# Patient Record
Sex: MALE | Race: WHITE | Employment: FULL TIME | URBAN - METROPOLITAN AREA
[De-identification: names, ages, dates, MRNs, and addresses within clinical notes are randomized per-mention and may not be internally consistent; named-entity substitution may affect disease eponyms.]

---

## 2020-07-28 ENCOUNTER — HOSPITAL ENCOUNTER (OUTPATIENT)
Age: 35
Discharge: HOME | End: 2020-07-28
Payer: COMMERCIAL

## 2020-07-28 VITALS
DIASTOLIC BLOOD PRESSURE: 75 MMHG | BODY MASS INDEX: 25.06 KG/M2 | TEMPERATURE: 98 F | RESPIRATION RATE: 16 BRPM | HEIGHT: 72 IN | WEIGHT: 185 LBS | HEART RATE: 58 BPM | OXYGEN SATURATION: 100 % | SYSTOLIC BLOOD PRESSURE: 128 MMHG

## 2020-07-28 DIAGNOSIS — Z20.822 SUSPECTED COVID-19 VIRUS INFECTION: Primary | ICD-10-CM

## 2020-07-28 LAB — S PYO AG THROAT QL: NEGATIVE

## 2020-07-28 PROCEDURE — 87880 STREP A ASSAY W/OPTIC: CPT | Performed by: PHYSICIAN ASSISTANT

## 2020-07-28 PROCEDURE — 99202 OFFICE O/P NEW SF 15 MIN: CPT | Performed by: PHYSICIAN ASSISTANT

## 2020-07-28 NOTE — ED INITIAL ASSESSMENT (HPI)
Patient states having sore throat, increased fatigue, body aches since yesterday. Patient states having some intermittent nausea, denies v/d. 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

## 2020-07-28 NOTE — ED PROVIDER NOTES
Patient Seen in: [address]      History   Patient presents with:  Testing  Sore Throat  Fatigue    Stated Complaint: covid testing    HPI    28 yo male presents for COVID testing.   Pt having some sore throat, increased General: Abdomen is flat. Musculoskeletal: Normal range of motion. Skin:     General: Skin is warm. Neurological:      General: No focal deficit present. Mental Status: He is alert and oriented to person, place, and time.    Psychiatric:

## 2020-07-30 LAB — SARS-COV-2 RNA RESP QL NAA+PROBE: NOT DETECTED

## 2020-10-05 ENCOUNTER — HOSPITAL ENCOUNTER (OUTPATIENT)
Age: 35
Discharge: HOME | End: 2020-10-05
Payer: COMMERCIAL

## 2020-10-05 VITALS
RESPIRATION RATE: 14 BRPM | SYSTOLIC BLOOD PRESSURE: 141 MMHG | DIASTOLIC BLOOD PRESSURE: 74 MMHG | BODY MASS INDEX: 25 KG/M2 | HEART RATE: 64 BPM | OXYGEN SATURATION: 100 % | WEIGHT: 185 LBS | TEMPERATURE: 98 F

## 2020-10-05 DIAGNOSIS — J02.9 VIRAL PHARYNGITIS: Primary | ICD-10-CM

## 2020-10-05 DIAGNOSIS — Z20.822 ENCOUNTER FOR LABORATORY TESTING FOR COVID-19 VIRUS: ICD-10-CM

## 2020-10-05 PROCEDURE — 87880 STREP A ASSAY W/OPTIC: CPT | Performed by: NURSE PRACTITIONER

## 2020-10-05 PROCEDURE — 99213 OFFICE O/P EST LOW 20 MIN: CPT | Performed by: NURSE PRACTITIONER

## 2020-10-05 NOTE — ED INITIAL ASSESSMENT (HPI)
Pt c/o headache and sore throat since yesterday. States coworker tested positive 9/30 whom he was last with 9/29. No fever. Awake/alert. Breathing easy and even without distress. Speech clear. Skin warm, dry and pink.

## 2020-10-05 NOTE — ED PROVIDER NOTES
Patient Seen in: [facility]    History   CC: covid testing  HPI: Betty Jesus 28year old male  who presents requesting covid testing. States he was exposed last to a co-worker 7 days ago who has tested + for covid. Oropharynx clear, posterior pharynx is mildly erythematous without tonsilar enlargement or exudate, uvula midline, +gag, voice is clear  Neck - no significant adenopathy, supple with trachea midline  Resp - Lung sounds clear bilaterally and wob unlabored

## 2021-07-07 PROBLEM — R53.83 FATIGUE: Status: ACTIVE | Noted: 2017-02-22

## 2021-07-07 PROBLEM — G47.00 INSOMNIA: Status: ACTIVE | Noted: 2017-02-22

## 2022-06-17 ENCOUNTER — APPOINTMENT (OUTPATIENT)
Dept: GENERAL RADIOLOGY | Age: 37
End: 2022-06-17
Attending: NURSE PRACTITIONER
Payer: COMMERCIAL

## 2022-06-17 ENCOUNTER — HOSPITAL ENCOUNTER (OUTPATIENT)
Age: 37
Discharge: HOME | End: 2022-06-17
Payer: COMMERCIAL

## 2022-06-17 VITALS
RESPIRATION RATE: 14 BRPM | BODY MASS INDEX: 25.06 KG/M2 | TEMPERATURE: 100 F | SYSTOLIC BLOOD PRESSURE: 126 MMHG | OXYGEN SATURATION: 99 % | WEIGHT: 185 LBS | HEIGHT: 72 IN | DIASTOLIC BLOOD PRESSURE: 63 MMHG | HEART RATE: 95 BPM

## 2022-06-17 DIAGNOSIS — J06.9 UPPER RESPIRATORY TRACT INFECTION, UNSPECIFIED TYPE: ICD-10-CM

## 2022-06-17 DIAGNOSIS — R50.9 FEVER, UNSPECIFIED FEVER CAUSE: ICD-10-CM

## 2022-06-17 DIAGNOSIS — Z20.822 ENCOUNTER FOR SCREENING LABORATORY TESTING FOR COVID-19 VIRUS: Primary | ICD-10-CM

## 2022-06-17 LAB — SARS-COV-2 RNA RESP QL NAA+PROBE: NOT DETECTED

## 2022-06-17 PROCEDURE — U0002 COVID-19 LAB TEST NON-CDC: HCPCS | Performed by: NURSE PRACTITIONER

## 2022-06-17 PROCEDURE — 71046 X-RAY EXAM CHEST 2 VIEWS: CPT | Performed by: NURSE PRACTITIONER

## 2022-06-17 PROCEDURE — 99213 OFFICE O/P EST LOW 20 MIN: CPT | Performed by: NURSE PRACTITIONER

## 2022-06-17 NOTE — ED INITIAL ASSESSMENT (HPI)
Patient complains of body aches, fever, cough, congestion x1day. Tylenol at 3ppm today, Pt was just sick 2weeks ago.

## 2022-06-21 ENCOUNTER — HOSPITAL ENCOUNTER (OUTPATIENT)
Age: 37
Discharge: HOME | End: 2022-06-21
Payer: COMMERCIAL

## 2022-06-21 VITALS
SYSTOLIC BLOOD PRESSURE: 138 MMHG | DIASTOLIC BLOOD PRESSURE: 86 MMHG | TEMPERATURE: 98 F | RESPIRATION RATE: 16 BRPM | HEART RATE: 78 BPM | OXYGEN SATURATION: 98 %

## 2022-06-21 DIAGNOSIS — S61.412A LACERATION OF LEFT HAND WITHOUT FOREIGN BODY, INITIAL ENCOUNTER: Primary | ICD-10-CM

## 2022-06-21 PROCEDURE — 12001 RPR S/N/AX/GEN/TRNK 2.5CM/<: CPT | Performed by: EMERGENCY MEDICINE

## 2022-06-21 PROCEDURE — 99213 OFFICE O/P EST LOW 20 MIN: CPT | Performed by: EMERGENCY MEDICINE

## 2022-06-21 RX ORDER — IBUPROFEN 600 MG/1
600 TABLET ORAL EVERY 6 HOURS PRN
Qty: 20 TABLET | Refills: 0 | Status: SHIPPED | OUTPATIENT
Start: 2022-06-21

## 2022-06-21 RX ORDER — CEFADROXIL 500 MG/1
500 CAPSULE ORAL 2 TIMES DAILY
Qty: 10 CAPSULE | Refills: 0 | Status: SHIPPED | OUTPATIENT
Start: 2022-06-21 | End: 2022-06-26

## 2022-06-21 NOTE — ED INITIAL ASSESSMENT (HPI)
Pt working with screwdriver this afternoon. Slipped and stabbed L hand at base of thumb. Cleansed at home with hydrogen peroxide. Bleeding controlled. Unsure of last tetanus, thinks within last 10 years. nv intact. Cleansed and irrigated.

## 2023-10-01 ENCOUNTER — HOSPITAL ENCOUNTER (OUTPATIENT)
Age: 38
Discharge: HOME | End: 2023-10-01
Payer: COMMERCIAL

## 2023-10-01 VITALS
SYSTOLIC BLOOD PRESSURE: 147 MMHG | OXYGEN SATURATION: 100 % | HEART RATE: 82 BPM | RESPIRATION RATE: 18 BRPM | DIASTOLIC BLOOD PRESSURE: 70 MMHG | TEMPERATURE: 98 F

## 2023-10-01 DIAGNOSIS — B02.8 HERPES ZOSTER WITH COMPLICATION: Primary | ICD-10-CM

## 2023-10-01 PROCEDURE — 99213 OFFICE O/P EST LOW 20 MIN: CPT | Performed by: NURSE PRACTITIONER

## 2023-10-01 RX ORDER — VALACYCLOVIR HYDROCHLORIDE 1 G/1
1000 TABLET, FILM COATED ORAL 3 TIMES DAILY
Qty: 21 TABLET | Refills: 0 | Status: SHIPPED | OUTPATIENT
Start: 2023-10-01 | End: 2023-10-08

## 2023-10-01 NOTE — ED INITIAL ASSESSMENT (HPI)
Patient comes in due to a rash that he thought was a bug bite so a single red jose and has grown up his abdomen on his right side, doesn't hurt but when he touches it it is irritable.

## 2023-10-01 NOTE — DISCHARGE INSTRUCTIONS
Valacyclovir 1 tablet three times a day for 7 days  Mupirocin ointment three times per day for 7 days  Wash hands frequently  Keep area covered  You're considered contagious until lesions crust over  Please follow up with your primary care provider in 1 week if no improvement

## 2023-10-04 ENCOUNTER — HOSPITAL ENCOUNTER (OUTPATIENT)
Age: 38
Discharge: HOME | End: 2023-10-04
Payer: COMMERCIAL

## 2023-10-04 VITALS
SYSTOLIC BLOOD PRESSURE: 129 MMHG | RESPIRATION RATE: 20 BRPM | DIASTOLIC BLOOD PRESSURE: 62 MMHG | TEMPERATURE: 99 F | OXYGEN SATURATION: 99 % | HEART RATE: 61 BPM

## 2023-10-04 DIAGNOSIS — L23.7 POISON IVY DERMATITIS: Primary | ICD-10-CM

## 2023-10-04 PROCEDURE — 99213 OFFICE O/P EST LOW 20 MIN: CPT | Performed by: NURSE PRACTITIONER

## 2023-10-04 RX ORDER — PREDNISONE 20 MG/1
TABLET ORAL
Qty: 18 TABLET | Refills: 0 | Status: SHIPPED | OUTPATIENT
Start: 2023-10-04

## 2023-10-04 NOTE — ED INITIAL ASSESSMENT (HPI)
Patient comes in complains that he was here over the weekend and was diagnosed with shingles and was prescribed valtrex and mupurion ointment and has gotten worse and is thinking that it may be something else and misdiagnosed.

## 2023-10-04 NOTE — DISCHARGE INSTRUCTIONS
Take Benadryl as needed for the rash and itching. Take the steroid daily to help with itching and inflammation. Stop the antibiotic cream and the antiviral pill. Use a barrier cream like zinc or Eucerin on the abdominal rash. Make sure to clean it well while in the shower. This will take several weeks to resolve.   Consider following up with dermatology for persistent symptoms

## 2024-04-01 ENCOUNTER — HOSPITAL ENCOUNTER (OUTPATIENT)
Age: 39
Discharge: HOME | End: 2024-04-01
Payer: COMMERCIAL

## 2024-04-01 VITALS
OXYGEN SATURATION: 100 % | RESPIRATION RATE: 16 BRPM | DIASTOLIC BLOOD PRESSURE: 72 MMHG | TEMPERATURE: 99 F | HEART RATE: 75 BPM | SYSTOLIC BLOOD PRESSURE: 122 MMHG

## 2024-04-01 DIAGNOSIS — J02.0 STREP PHARYNGITIS: Primary | ICD-10-CM

## 2024-04-01 LAB — S PYO AG THROAT QL: POSITIVE

## 2024-04-01 PROCEDURE — 99213 OFFICE O/P EST LOW 20 MIN: CPT | Performed by: NURSE PRACTITIONER

## 2024-04-01 PROCEDURE — 87880 STREP A ASSAY W/OPTIC: CPT | Performed by: NURSE PRACTITIONER

## 2024-04-01 RX ORDER — AMOXICILLIN 500 MG/1
1000 TABLET, FILM COATED ORAL DAILY
Qty: 20 TABLET | Refills: 0 | Status: SHIPPED | OUTPATIENT
Start: 2024-04-01 | End: 2024-04-11

## 2024-04-01 NOTE — ED PROVIDER NOTES
Patient Seen in: [facility]      History   No chief complaint on file.    Stated Complaint: Sore Throat, Fatigue    Subjective:   HPI    38-year-old male presents with sore throat and generalized malaise off-and-on for the past 2 weeks.  Children at home tested positive for strep yesterday, is here for strep testing. No f/c/n/v/d.  No drooling/trismus/submandibular swelling.  Speech clear and intact. No muffled voice, drooling, sialorrhea.     Objective:   History reviewed. No pertinent past medical history.           Past Surgical History:   Procedure Laterality Date    KNEE SURGERY      OTHER      L shoulder surgery                Social History     Socioeconomic History    Marital status:    Tobacco Use    Smoking status: Never     Passive exposure: Never    Smokeless tobacco: Never   Vaping Use    Vaping Use: Never used   Substance and Sexual Activity    Alcohol use: Yes     Comment: occ    Drug use: No              Review of Systems    Positive for stated complaint: Sore Throat, Fatigue  Other systems are as noted in HPI.  Constitutional and vital signs reviewed.      All other systems reviewed and negative except as noted above.    Physical Exam     ED Triage Vitals   BP 04/01/24 1154 122/72   Pulse 04/01/24 1154 75   Resp 04/01/24 1154 16   Temp 04/01/24 1154 98.5 °F (36.9 °C)   Temp src 04/01/24 1154 Temporal   SpO2 04/01/24 1154 100 %   O2 Device 04/01/24 1152 None (Room air)       Current:/72   Pulse 75   Temp 98.5 °F (36.9 °C) (Temporal)   Resp 16   SpO2 100%         Physical Exam  Vitals and nursing note reviewed.   HENT:      Head: Normocephalic.      Right Ear: Tympanic membrane normal.      Left Ear: Tympanic membrane normal.      Nose: Nose normal.      Mouth/Throat:      Mouth: Mucous membranes are moist.      Pharynx: Posterior oropharyngeal erythema present.   Eyes:      Pupils: Pupils are equal, round, and reactive to light.   Cardiovascular:      Rate and Rhythm:  Normal rate and regular rhythm.   Pulmonary:      Effort: Pulmonary effort is normal. No respiratory distress.      Breath sounds: No wheezing.   Abdominal:      General: There is no distension.      Tenderness: There is no abdominal tenderness.   Musculoskeletal:         General: Normal range of motion.      Cervical back: Normal range of motion.   Skin:     General: Skin is warm and dry.   Neurological:      Mental Status: He is alert.             ED Course     Labs Reviewed   POCT RAPID STREP - Abnormal; Notable for the following components:       Result Value    POCT Rapid Strep Positive (*)     All other components within normal limits          ED Course as of 04/01/24 1220  ------------------------------------------------------------  Time: 04/01 1158  Value: POCT RAPID STREP(!): Positive  Comment: (Reviewed)          [facility]                                         Medical Decision Making  38 year old male with here for sore throat, positive exposure from kids at home . Well-appearing, well-hydrated on exam. May consider strep v viral pharyngitis.     +strep test at today. Discussed supportive care.     Plan:   - home with supportive care  - tylenol, motrin prn  - f/u with PCP    Physical exam remained stable over serial reexaminations as previously documented.      I have discussed with the patient the results of tests, differential diagnosis, and warning signs and symptoms that should prompt immediate return.  The patient understands these instructions and agrees to the follow-up plan provided.  There are no barriers to learning.   Appropriate f/u given.  Patient agrees to return for any concerns/problems/complications.            Disposition and Plan     Clinical Impression:  1. Strep pharyngitis         Disposition:  Discharge  4/1/2024 12:01 pm    Follow-up:  Fred Hand MD  [address] [place] [address]  [address] [place], [place] [id number] [phone]                Medications Prescribed:  Discharge Medication List  as of 4/1/2024 12:12 PM        START taking these medications    Details   amoxicillin 500 MG Oral Tab Take 2 tablets (1,000 mg total) by mouth daily for 10 days., Normal, Disp-20 tablet, R-0

## 2024-04-01 NOTE — ED INITIAL ASSESSMENT (HPI)
Patient comes in states that 2 weeks ago patient had a sore throat and fatigue, and felt a bit better and then his kids tested positive for step yesterday and states that x 1 week now his throat continues to hurt.

## 2024-07-03 ENCOUNTER — HOSPITAL ENCOUNTER (OUTPATIENT)
Age: 39
Discharge: HOME | End: 2024-07-03
Payer: COMMERCIAL

## 2024-07-03 VITALS
TEMPERATURE: 98 F | DIASTOLIC BLOOD PRESSURE: 84 MMHG | SYSTOLIC BLOOD PRESSURE: 121 MMHG | OXYGEN SATURATION: 99 % | RESPIRATION RATE: 16 BRPM | HEART RATE: 61 BPM

## 2024-07-03 DIAGNOSIS — R53.83 FATIGUE, UNSPECIFIED TYPE: Primary | ICD-10-CM

## 2024-07-03 LAB — S PYO AG THROAT QL: NEGATIVE

## 2024-07-03 PROCEDURE — 99213 OFFICE O/P EST LOW 20 MIN: CPT | Performed by: PHYSICIAN ASSISTANT

## 2024-07-03 PROCEDURE — 87880 STREP A ASSAY W/OPTIC: CPT | Performed by: PHYSICIAN ASSISTANT

## 2024-07-03 NOTE — ED PROVIDER NOTES
Patient Seen in: [facility]      History     Chief Complaint   Patient presents with    Sore Throat     Stated Complaint: Sore Throat    Subjective:   HPI    38-year-old male presenting with fatigue.  Patient states he has felt a little bit off a few days over the last week.  Describes it as a brain fog.  He does not feel sick and denies any fevers, cough, URI symptoms.  He is no complaint of chest pain or shortness of breath.  His daughter was diagnosed with strep throat recently.  He has also started a new supplement (bovine colostrum) over the last few weeks.    Objective:   History reviewed. No pertinent past medical history.           Past Surgical History:   Procedure Laterality Date    Knee surgery      Other      L shoulder surgery                Social History     Socioeconomic History    Marital status:    Tobacco Use    Smoking status: Never     Passive exposure: Never    Smokeless tobacco: Never   Vaping Use    Vaping status: Never Used   Substance and Sexual Activity    Alcohol use: Yes     Comment: occ    Drug use: No     Social Determinants of Health      [facility], [facility]    Social Connections    Received from [facility], [facility]    Housing Stability              Review of Systems    Positive for stated Chief Complaint: Sore Throat    Other systems are as noted in HPI.  Constitutional and vital signs reviewed.      All other systems reviewed and negative except as noted above.    Physical Exam     ED Triage Vitals [07/03/24 1616]   /84   Pulse 61   Resp 16   Temp 98.2 °F (36.8 °C)   Temp src Temporal   SpO2 99 %   O2 Device None (Room air)       Current Vitals:   Vital Signs  BP: 121/84  Pulse: 61  Resp: 16  Temp: 98.2 °F (36.8 °C)  Temp src: Temporal    Oxygen Therapy  SpO2: 99 %  O2 Device: None (Room air)            Physical Exam  Vitals and nursing note reviewed.    Constitutional:       General: He is not in acute distress.  HENT:      Head: Normocephalic and atraumatic.      Right Ear: External ear normal.      Left Ear: External ear normal.      Nose: Nose normal.      Mouth/Throat:      Mouth: Mucous membranes are moist.      Pharynx: Uvula midline.      Tonsils: No tonsillar exudate.   Eyes:      Extraocular Movements: Extraocular movements intact.      Pupils: Pupils are equal, round, and reactive to light.   Cardiovascular:      Rate and Rhythm: Normal rate.      Heart sounds: No murmur heard.  Pulmonary:      Effort: Pulmonary effort is normal.      Breath sounds: No wheezing.   Abdominal:      General: Abdomen is flat.   Musculoskeletal:         General: Normal range of motion.      Cervical back: Normal range of motion.   Skin:     General: Skin is warm.   Neurological:      General: No focal deficit present.      Mental Status: He is alert and oriented to person, place, and time.   Psychiatric:         Mood and Affect: Mood normal.         Behavior: Behavior normal.               ED Course     Labs Reviewed   POCT RAPID STREP - Normal        38-year-old male presenting for evaluation of fatigue, brain fog.  Patient afebrile in the immediate care and very well-appearing.  Ddx-fatigue, medication side effect, strep  Strep is negative.  Recommend supportive care, continued monitoring and PCP follow-up.              MDM                                         Medical Decision Making      Disposition and Plan     Clinical Impression:  1. Fatigue, unspecified type         Disposition:  Discharge  7/3/2024  4:40 pm    Follow-up:  Fred Hand MD  [address] [place] [id number]  [phone]                Medications Prescribed:  Discharge Medication List as of 7/3/2024  4:47 PM

## 2024-08-19 ENCOUNTER — HOSPITAL ENCOUNTER (OUTPATIENT)
Age: 39
Discharge: HOME | End: 2024-08-19
Payer: COMMERCIAL

## 2024-08-19 VITALS
DIASTOLIC BLOOD PRESSURE: 72 MMHG | RESPIRATION RATE: 18 BRPM | SYSTOLIC BLOOD PRESSURE: 126 MMHG | OXYGEN SATURATION: 98 % | TEMPERATURE: 99 F | HEART RATE: 61 BPM

## 2024-08-19 DIAGNOSIS — R21 RASH: Primary | ICD-10-CM

## 2024-08-19 PROCEDURE — 99213 OFFICE O/P EST LOW 20 MIN: CPT | Performed by: NURSE PRACTITIONER

## 2024-08-19 RX ORDER — CLOBETASOL PROPIONATE 0.5 MG/G
1 OINTMENT TOPICAL 2 TIMES DAILY
Qty: 60 G | Refills: 0 | Status: SHIPPED | OUTPATIENT
Start: 2024-08-19 | End: 2024-08-26

## 2024-08-19 RX ORDER — CEFADROXIL 500 MG/1
500 CAPSULE ORAL 2 TIMES DAILY
Qty: 14 CAPSULE | Refills: 0 | Status: SHIPPED | OUTPATIENT
Start: 2024-08-19 | End: 2024-08-26

## 2024-08-19 RX ORDER — PREDNISONE 10 MG/1
TABLET ORAL
Qty: 28 TABLET | Refills: 0 | Status: SHIPPED | OUTPATIENT
Start: 2024-08-19 | End: 2024-08-31

## 2024-08-19 NOTE — DISCHARGE INSTRUCTIONS
Prednisone as prescribed, take with food  Clobetasol cream twice a day for 7 days  Cefadroxil 1 tablet twice per day for 7 days  Return for any new or worsening symptoms  Follow-up with primary care provider in 1 week if no improvement

## 2024-08-19 NOTE — ED PROVIDER NOTES
Chief Complaint   Patient presents with    Skin Problem       HPI:     Steve Rich is a 39 year old male who presents today with a chief complaint of itchy rash throughout the body that has been ongoing for a week.  Reports it started after pulling weeds.  Rash is not painful.  No fever.  He does report his wife changed laundry detergent about 3 weeks ago.  Otherwise no new exposures.  No new medications.  No recent travel.    PFS      PFS asessment screens reviewed and agree.  Nurses notes reviewed I agree with documentation.    No family history on file.  Family history reviewed with patient/caregiver and is not pertinent to presenting problem.  Social History     Socioeconomic History    Marital status:      Spouse name: Not on file    Number of children: Not on file    Years of education: Not on file    Highest education level: Not on file   Occupational History    Not on file   Tobacco Use    Smoking status: Never     Passive exposure: Never    Smokeless tobacco: Never   Vaping Use    Vaping status: Never Used   Substance and Sexual Activity    Alcohol use: Yes     Comment: occ    Drug use: No    Sexual activity: Not on file   Other Topics Concern    Not on file   Social History Narrative    Not on file     Social Determinants of Health     Financial Resource Strain: Not on file   Food Insecurity: Not on file   Transportation Needs: Not on file   Physical Activity: Not on file   Stress: Not on file   Social Connections: Unknown (3/13/2021)    [facility]    Social Connections     Conversations with friends/family/neighbors per week: Not on file   Housing Stability: Low Risk  (7/17/2021)    [facility] from [facility], [facility]    Housing Stability     Mortgage Payment Concerns?: Not on file     Number of Places Lived in the Last Year: Not on file     Unstable Housing?: Not on file          ROS:   Positive for stated complaint: rash  All other systems reviewed and negative except as noted above.  Constitutional and Vital Signs Reviewed.      Physical Exam:     Rash:    Scattered vesicles on erythematous base noted to bilateral lower legs.  To the back and chest along with abdominal area there are several vesicles along with  pustules.  No areas are tender. No proximal red streaking.    Physical Exam:  /72   Pulse 61   Temp 98.5 °F (36.9 °C) (Temporal)   Resp 18   SpO2 98%   GENERAL: well developed, well nourished, well hydrated, no distress  SKIN: good skin turgor, see above description for rash  HEENT: atraumatic, normocephalic, ears, nose and throat are clear  EYES: sclera non icteric bilateral  NECK: supple, no adenopathy  LUNGS: clear to auscultation, no RRW  CARDIO: RRR without murmur  EXTREMITIES: no cyanosis or edema. MOONEY without difficulty.    MDM/Assessment/Plan:   Orders for this encounter:    Orders Placed This Encounter    predniSONE 10 MG Oral Tab     Sig: Take 4 tablets (40 mg total) by mouth daily for 4 days, THEN 2 tablets (20 mg total) daily for 4 days, THEN 1 tablet (10 mg total) daily for 4 days.     Dispense:  28 tablet     Refill:  0    cefadroxil 500 MG Oral Cap     Sig: Take 1 capsule (500 mg total) by mouth 2 (two) times daily for 7 days.     Dispense:  14 capsule     Refill:  0    clobetasol 0.05 % External Ointment     Sig: Apply 1 Application topically 2 (two) times daily for 7 days.     Dispense:  60 g     Refill:  0       Labs performed this visit:  No results found for this or any previous visit (from the past 10 hour(s)).    MDM:  Medical Decision Making  Differentials considered: Poison ivy versus folliculitis versus contact dermatitis versus cellulitis versus other    HPI and exam consistent with poison ivy versus folliculitis.  No signs of cellulitis today.  Will cover with his prednisone, clobetasol, cefadroxil.  Return for any new or worsening  symptoms.  If no improvement in 1 week, follow-up with primary care provider.  Patient verbalized understanding and agreeable to plan of care.     Risk  Prescription drug management.          Diagnosis:    ICD-10-CM    1. Rash  R21           All results reviewed and discussed with patient.  See AVS for detailed discharge instructions for your condition today.    Follow Up with:  No follow-up provider specified.

## 2024-08-19 NOTE — ED INITIAL ASSESSMENT (HPI)
Pt c/o bug bites all over body worsening over past week. States did cut bushes 1 week ago without his shirt on.  States walked outside yesterday.

## 2024-10-08 ENCOUNTER — HOSPITAL ENCOUNTER (OUTPATIENT)
Age: 39
Discharge: HOME | End: 2024-10-08
Payer: COMMERCIAL

## 2024-10-08 VITALS
OXYGEN SATURATION: 100 % | DIASTOLIC BLOOD PRESSURE: 88 MMHG | TEMPERATURE: 97 F | HEART RATE: 68 BPM | RESPIRATION RATE: 18 BRPM | SYSTOLIC BLOOD PRESSURE: 133 MMHG

## 2024-10-08 DIAGNOSIS — J06.9 VIRAL UPPER RESPIRATORY TRACT INFECTION: Primary | ICD-10-CM

## 2024-10-08 LAB — S PYO AG THROAT QL: NEGATIVE

## 2024-10-08 PROCEDURE — 87880 STREP A ASSAY W/OPTIC: CPT

## 2024-10-08 PROCEDURE — 99213 OFFICE O/P EST LOW 20 MIN: CPT

## 2024-10-08 NOTE — DISCHARGE INSTRUCTIONS
Strep test is negative.  There are no signs of infection on physical exam.  This is likely a viral illness.  Please be sure to drink plenty of fluids, use Tylenol and Motrin for pain or fever.  Use Flonase and Mucinex for congestion.  If you develop any respiratory complaints, fever that does not improve with medications or any other concerning complaints you should go to the emergency department. Otherwise follow up with your primary care provider.

## 2024-10-08 NOTE — ED PROVIDER NOTES
Patient Seen in: [facility]      History     Chief Complaint   Patient presents with    Cough     Stated Complaint: Cough  Subjective:   Clovis is a 39-year-old male presenting to the immediate care complaining of sore throat, congestion, nausea and a mild headache for the past week.  Patient states that his 3 daughters have also been sick with similar symptoms.  Patient states that the sore throat and the nausea started today and the last time he had strep but those were the symptoms that he had so he is requesting a strep test.  States he has a mildly scratchy throat, mild pain when he swallows.  No difficulty swallowing or voice changes.  He has not had a fever.  Denies any episodes of respiratory distress or difficulty breathing.  He is eating and drinking well and is well-hydrated.  He denies any other concerns or complaints.        Objective:   History reviewed. No pertinent past medical history.         Past Surgical History:   Procedure Laterality Date    Knee surgery      Other      L shoulder surgery              Social History     Socioeconomic History    Marital status:    Tobacco Use    Smoking status: Never     Passive exposure: Never    Smokeless tobacco: Never   Vaping Use    Vaping status: Never Used   Substance and Sexual Activity    Alcohol use: Yes     Comment: occ    Drug use: No     Social Determinants of Health      [facility]    Social Connections    [facility]    Housing Stability            Review of Systems    Positive for stated complaint: Cough    Other systems are as noted in HPI.  Constitutional and vital signs reviewed.      All other systems reviewed and negative except as noted above.    Physical Exam     ED Triage Vitals [10/08/24 1016]   /88   Pulse 68   Resp 18   Temp 97.4 °F (36.3 °C)   Temp src Temporal   SpO2 100 %   O2  Device None (Room air)     Current:/88   Pulse 68   Temp 97.4 °F (36.3 °C) (Temporal)   Resp 18   SpO2 100%     Physical Exam  Vitals and nursing note reviewed.   Constitutional:       General: He is not in acute distress.     Appearance: Normal appearance. He is not ill-appearing, toxic-appearing or diaphoretic.   HENT:      Head: Normocephalic.      Right Ear: Tympanic membrane, ear canal and external ear normal.      Left Ear: Tympanic membrane, ear canal and external ear normal.      Nose: Nose normal.      Mouth/Throat:      Mouth: Mucous membranes are moist.      Pharynx: Oropharynx is clear. Uvula midline. Posterior oropharyngeal erythema present. No pharyngeal swelling, oropharyngeal exudate or uvula swelling.      Tonsils: No tonsillar exudate or tonsillar abscesses. 0 on the right. 0 on the left.      Comments: Mild cobblestoning, no trismus  Eyes:      Conjunctiva/sclera: Conjunctivae normal.   Cardiovascular:      Rate and Rhythm: Normal rate and regular rhythm.      Pulses: Normal pulses.      Heart sounds: Normal heart sounds.   Pulmonary:      Effort: Pulmonary effort is normal. No respiratory distress.      Breath sounds: Normal breath sounds. No stridor. No wheezing, rhonchi or rales.   Abdominal:      General: Abdomen is flat.   Musculoskeletal:         General: Normal range of motion.      Cervical back: Normal range of motion.   Skin:     General: Skin is warm.      Capillary Refill: Capillary refill takes less than 2 seconds.   Neurological:      General: No focal deficit present.      Mental Status: He is alert and oriented to person, place, and time.   Psychiatric:         Mood and Affect: Mood normal.         Behavior: Behavior normal.         Thought Content: Thought content normal.         Judgment: Judgment normal.         ED Course   Radiology:  No results found.  Labs Reviewed   POCT RAPID STREP - Normal       MDM     Medical Decision Making  Differential diagnoses reflecting  the complexity of care include but are not limited to viral versus bacterial etiology of upper respiratory complaints.    Comorbidities that add complexity to management include: None  History obtained by an independent source was from: Patient  Patient is well appearing, non-toxic and in no acute distress.  Vital signs are stable.   Strep test is negative.  There is minimal utility in COVID or influenza testing given the duration of illness as it would not .  There are no signs of infection on physical exam.  History and physical exam are consistent with viral illness.  Recommended that patient drink plenty of fluids, use Tylenol and Motrin for pain or fever, use Flonase for nasal congestion and may use over-the-counter medications for congestion as needed.  Recommended that if the patient develops any chest pain, respiratory complaints, fever that does not improve with medications or any other concerning complaints they should go to the emergency department.  ED precautions discussed.  Patient (guardian) advised to follow up with PCP in 2-3 days.  Patient (guardian) agrees with this plan of care.  Patient (guardian) verbalizes understanding of discharge instructions and plan of care.      Amount and/or Complexity of Data Reviewed  Labs: ordered. Decision-making details documented in ED Course.    Risk  OTC drugs.        Disposition and Plan     Clinical Impression:  1. Viral upper respiratory tract infection         Disposition:  Discharge  10/8/2024 10:36 am    Follow-up:  Fred Hand MD  [address] [place] [id number] [phone]                Medications Prescribed:  Discharge Medication List as of 10/8/2024 10:38 AM

## 2025-03-22 ENCOUNTER — HOSPITAL ENCOUNTER (OUTPATIENT)
Age: 40
Discharge: HOME | End: 2025-03-22
Payer: COMMERCIAL

## 2025-03-22 VITALS
HEART RATE: 77 BPM | OXYGEN SATURATION: 97 % | TEMPERATURE: 98 F | SYSTOLIC BLOOD PRESSURE: 141 MMHG | DIASTOLIC BLOOD PRESSURE: 85 MMHG | RESPIRATION RATE: 20 BRPM

## 2025-03-22 DIAGNOSIS — J02.0 STREP PHARYNGITIS: Primary | ICD-10-CM

## 2025-03-22 LAB — S PYO AG THROAT QL: POSITIVE

## 2025-03-22 PROCEDURE — 99213 OFFICE O/P EST LOW 20 MIN: CPT | Performed by: NURSE PRACTITIONER

## 2025-03-22 PROCEDURE — 87880 STREP A ASSAY W/OPTIC: CPT | Performed by: NURSE PRACTITIONER

## 2025-03-22 RX ORDER — AMOXICILLIN 500 MG/1
500 TABLET, FILM COATED ORAL 2 TIMES DAILY
Qty: 20 TABLET | Refills: 0 | Status: SHIPPED | OUTPATIENT
Start: 2025-03-22 | End: 2025-04-01

## 2025-03-22 RX ORDER — MELOXICAM 15 MG/1
15 TABLET ORAL DAILY PRN
COMMUNITY

## 2025-03-22 NOTE — ED PROVIDER NOTES
Patient Seen in: [facility]      History   No chief complaint on file.    Stated Complaint: Sore Throat    Subjective:   HPI      39 year old male presents with sore throat, intermittent fevers x 1 week.  Positive sick contacts at home with strep.  Here for strep testing. No drooling/trismus/submandibular swelling.  Speech clear and intact. No muffled voice, drooling, sialorrhea.     Denies f/c/n/v/d. No recent sick exposures. Denies recent infections/covid exposures.      Objective:     History reviewed. No pertinent past medical history.           Past Surgical History:   Procedure Laterality Date    Knee surgery      Other      L shoulder surgery                Social History     Socioeconomic History    Marital status:    Tobacco Use    Smoking status: Never     Passive exposure: Never    Smokeless tobacco: Never   Vaping Use    Vaping status: Never Used   Substance and Sexual Activity    Alcohol use: Yes     Comment: occ    Drug use: No     Social Drivers of Health      Received from [facility], [facility]    Housing Stability              Review of Systems    Positive for stated complaint: Sore Throat  Other systems are as noted in HPI.  Constitutional and vital signs reviewed.      All other systems reviewed and negative except as noted above.    Physical Exam     ED Triage Vitals [03/22/25 1526]   /85   Pulse 77   Resp 20   Temp 98 °F (36.7 °C)   Temp src Oral   SpO2 97 %   O2 Device None (Room air)       Current Vitals:   Vital Signs  BP: 141/85  Pulse: 77  Resp: 20  Temp: 98 °F (36.7 °C)  Temp src: Oral    Oxygen Therapy  SpO2: 97 %  O2 Device: None (Room air)        Physical Exam  Vitals and nursing note reviewed.   HENT:      Head: Normocephalic.      Right Ear: Tympanic membrane normal.      Left Ear: Tympanic membrane normal.      Nose: Nose normal.      Mouth/Throat:      Mouth: Mucous membranes are moist.      Pharynx: Posterior  oropharyngeal erythema present. No oropharyngeal exudate.   Eyes:      Pupils: Pupils are equal, round, and reactive to light.   Cardiovascular:      Rate and Rhythm: Normal rate and regular rhythm.   Pulmonary:      Effort: Pulmonary effort is normal.   Musculoskeletal:         General: Normal range of motion.      Cervical back: Normal range of motion.   Skin:     General: Skin is warm and dry.   Neurological:      Mental Status: He is alert.             ED Course   Labs Reviewed - No data to display                MDM             Medical Decision Making  39 year old male p/w sore throat 3 days. Here for strep testing. Strep testing is positive. Will treat. VSS. O2 sat is 97%, speech intact. No pta, uvula midline.     Plan:   - home with supportive care  - tylenol, motrin prn  - f/u with PCP    Physical exam remained stable over serial reexaminations as previously documented. External chart review completed, no recent hospitalizations for the same.     I have discussed with the patient the results of tests, differential diagnosis, and warning signs and symptoms that should prompt immediate return.  The patient understands these instructions and agrees to the follow-up plan provided.  There are no barriers to learning.   Appropriate f/u given.  Patient agrees to return for any concerns/problems/complications.    Differential diagnosis reflecting the complexity of care include: URI, sinusitis, covid, strep pharyngitis, viral pharyngitis, AOM, OME, OE    Comorbidities that add complexity to management include:na    External chart review was done and was noted:y    History obtained by an independent source was from:Parent/patient    Discussions of management was done with:patient    My independent interpretation of studies of:na    Diagnostic tests and medications considered but not ordered were:none    Social determinants of health that affect care:na    Shared decision making was done by patient, self            Disposition and Plan     Clinical Impression:  1. Strep pharyngitis         Disposition:  Discharge  3/22/2025  3:35 pm    Follow-up:  Fred Hand MD  [address] [place], [place] [id number] [phone]                Medications Prescribed:  Discharge Medication List as of 3/22/2025  3:37 PM        START taking these medications    Details   amoxicillin 500 MG Oral Tab Take 1 tablet (500 mg total) by mouth 2 (two) times daily for 10 days., Normal, Disp-20 tablet, R-0                 Supplementary Documentation:

## (undated) NOTE — LETTER
Date & Time: 4/1/2024, 12:02 PM  Patient: Steve Rich  Encounter Provider(s):    Agustina Parmar APRN       To Whom It May Concern:    Steve Rich was seen and treated in our department on 4/1/2024. He should not return to work until 4/3/2024 .    If you have any questions or concerns, please do not hesitate to call.        _____________________________  Physician/APC Signature